# Patient Record
Sex: MALE | Race: BLACK OR AFRICAN AMERICAN | NOT HISPANIC OR LATINO | Employment: UNEMPLOYED | URBAN - METROPOLITAN AREA
[De-identification: names, ages, dates, MRNs, and addresses within clinical notes are randomized per-mention and may not be internally consistent; named-entity substitution may affect disease eponyms.]

---

## 2022-08-20 ENCOUNTER — HOSPITAL ENCOUNTER (EMERGENCY)
Facility: OTHER | Age: 32
Discharge: LEFT BEFORE DISCHARGE | End: 2022-08-20
Attending: EMERGENCY MEDICINE
Payer: MEDICAID

## 2022-08-20 VITALS
SYSTOLIC BLOOD PRESSURE: 131 MMHG | WEIGHT: 195 LBS | DIASTOLIC BLOOD PRESSURE: 82 MMHG | RESPIRATION RATE: 18 BRPM | OXYGEN SATURATION: 99 % | HEART RATE: 81 BPM | BODY MASS INDEX: 26.41 KG/M2 | HEIGHT: 72 IN | TEMPERATURE: 99 F

## 2022-08-20 DIAGNOSIS — R91.8 ABNORMAL CT SCAN OF LUNG: ICD-10-CM

## 2022-08-20 DIAGNOSIS — R05.9 COUGH: ICD-10-CM

## 2022-08-20 DIAGNOSIS — R50.9 FEVER, UNSPECIFIED FEVER CAUSE: ICD-10-CM

## 2022-08-20 DIAGNOSIS — B20 SYMPTOMATIC HIV INFECTION: ICD-10-CM

## 2022-08-20 DIAGNOSIS — J98.4 CAVITARY LESION OF LUNG: Primary | ICD-10-CM

## 2022-08-20 LAB
ALBUMIN SERPL BCP-MCNC: 3.5 G/DL (ref 3.5–5.2)
ALP SERPL-CCNC: 62 U/L (ref 55–135)
ALT SERPL W/O P-5'-P-CCNC: 23 U/L (ref 10–44)
ANION GAP SERPL CALC-SCNC: 10 MMOL/L (ref 8–16)
AST SERPL-CCNC: 25 U/L (ref 10–40)
BASOPHILS # BLD AUTO: 0.02 K/UL (ref 0–0.2)
BASOPHILS NFR BLD: 0.2 % (ref 0–1.9)
BILIRUB SERPL-MCNC: 0.5 MG/DL (ref 0.1–1)
BUN SERPL-MCNC: 7 MG/DL (ref 6–20)
CALCIUM SERPL-MCNC: 9.1 MG/DL (ref 8.7–10.5)
CHLORIDE SERPL-SCNC: 105 MMOL/L (ref 95–110)
CO2 SERPL-SCNC: 24 MMOL/L (ref 23–29)
CREAT SERPL-MCNC: 1 MG/DL (ref 0.5–1.4)
CTP QC/QA: YES
CTP QC/QA: YES
DIFFERENTIAL METHOD: ABNORMAL
EOSINOPHIL # BLD AUTO: 0.3 K/UL (ref 0–0.5)
EOSINOPHIL NFR BLD: 3.1 % (ref 0–8)
ERYTHROCYTE [DISTWIDTH] IN BLOOD BY AUTOMATED COUNT: 12.2 % (ref 11.5–14.5)
EST. GFR  (NO RACE VARIABLE): >60 ML/MIN/1.73 M^2
GLUCOSE SERPL-MCNC: 99 MG/DL (ref 70–110)
GROUP A STREP, MOLECULAR: NEGATIVE
HCT VFR BLD AUTO: 42.7 % (ref 40–54)
HGB BLD-MCNC: 14.9 G/DL (ref 14–18)
HIV 1+2 AB+HIV1 P24 AG SERPL QL IA: POSITIVE
IMM GRANULOCYTES # BLD AUTO: 0.02 K/UL (ref 0–0.04)
IMM GRANULOCYTES NFR BLD AUTO: 0.2 % (ref 0–0.5)
LACTATE SERPL-SCNC: 0.7 MMOL/L (ref 0.5–2.2)
LDH SERPL L TO P-CCNC: 225 U/L (ref 110–260)
LYMPHOCYTES # BLD AUTO: 2.3 K/UL (ref 1–4.8)
LYMPHOCYTES NFR BLD: 28.5 % (ref 18–48)
MCH RBC QN AUTO: 33.2 PG (ref 27–31)
MCHC RBC AUTO-ENTMCNC: 34.9 G/DL (ref 32–36)
MCV RBC AUTO: 95 FL (ref 82–98)
MONOCYTES # BLD AUTO: 0.9 K/UL (ref 0.3–1)
MONOCYTES NFR BLD: 11.2 % (ref 4–15)
NEUTROPHILS # BLD AUTO: 4.6 K/UL (ref 1.8–7.7)
NEUTROPHILS NFR BLD: 56.8 % (ref 38–73)
NRBC BLD-RTO: 0 /100 WBC
PLATELET # BLD AUTO: 246 K/UL (ref 150–450)
PMV BLD AUTO: 10 FL (ref 9.2–12.9)
POC MOLECULAR INFLUENZA A AGN: NEGATIVE
POC MOLECULAR INFLUENZA B AGN: NEGATIVE
POTASSIUM SERPL-SCNC: 4.4 MMOL/L (ref 3.5–5.1)
PROCALCITONIN SERPL IA-MCNC: 0.15 NG/ML
PROT SERPL-MCNC: 8.1 G/DL (ref 6–8.4)
RBC # BLD AUTO: 4.49 M/UL (ref 4.6–6.2)
SARS-COV-2 RDRP RESP QL NAA+PROBE: NEGATIVE
SODIUM SERPL-SCNC: 139 MMOL/L (ref 136–145)
WBC # BLD AUTO: 8.13 K/UL (ref 3.9–12.7)

## 2022-08-20 PROCEDURE — 87116 MYCOBACTERIA CULTURE: CPT | Performed by: NURSE PRACTITIONER

## 2022-08-20 PROCEDURE — 83615 LACTATE (LD) (LDH) ENZYME: CPT | Performed by: NURSE PRACTITIONER

## 2022-08-20 PROCEDURE — 87389 HIV-1 AG W/HIV-1&-2 AB AG IA: CPT | Mod: 91 | Performed by: NURSE PRACTITIONER

## 2022-08-20 PROCEDURE — 87040 BLOOD CULTURE FOR BACTERIA: CPT | Mod: 59 | Performed by: NURSE PRACTITIONER

## 2022-08-20 PROCEDURE — 87389 HIV-1 AG W/HIV-1&-2 AB AG IA: CPT | Performed by: NURSE PRACTITIONER

## 2022-08-20 PROCEDURE — 84145 PROCALCITONIN (PCT): CPT | Performed by: NURSE PRACTITIONER

## 2022-08-20 PROCEDURE — 87015 SPECIMEN INFECT AGNT CONCNTJ: CPT | Performed by: NURSE PRACTITIONER

## 2022-08-20 PROCEDURE — 36415 COLL VENOUS BLD VENIPUNCTURE: CPT | Performed by: NURSE PRACTITIONER

## 2022-08-20 PROCEDURE — 25000003 PHARM REV CODE 250: Performed by: NURSE PRACTITIONER

## 2022-08-20 PROCEDURE — 83605 ASSAY OF LACTIC ACID: CPT | Performed by: NURSE PRACTITIONER

## 2022-08-20 PROCEDURE — 99285 EMERGENCY DEPT VISIT HI MDM: CPT | Mod: 25

## 2022-08-20 PROCEDURE — 80053 COMPREHEN METABOLIC PANEL: CPT | Performed by: NURSE PRACTITIONER

## 2022-08-20 PROCEDURE — 87206 SMEAR FLUORESCENT/ACID STAI: CPT | Performed by: NURSE PRACTITIONER

## 2022-08-20 PROCEDURE — U0002 COVID-19 LAB TEST NON-CDC: HCPCS | Performed by: EMERGENCY MEDICINE

## 2022-08-20 PROCEDURE — 25500020 PHARM REV CODE 255: Performed by: EMERGENCY MEDICINE

## 2022-08-20 PROCEDURE — 85025 COMPLETE CBC W/AUTO DIFF WBC: CPT | Performed by: NURSE PRACTITIONER

## 2022-08-20 PROCEDURE — 87502 INFLUENZA DNA AMP PROBE: CPT

## 2022-08-20 PROCEDURE — 87651 STREP A DNA AMP PROBE: CPT | Performed by: NURSE PRACTITIONER

## 2022-08-20 RX ORDER — CLINDAMYCIN HYDROCHLORIDE 300 MG/1
300 CAPSULE ORAL EVERY 6 HOURS
Qty: 28 CAPSULE | Refills: 0 | Status: SHIPPED | OUTPATIENT
Start: 2022-08-20 | End: 2022-08-27

## 2022-08-20 RX ORDER — ACETAMINOPHEN 500 MG
1000 TABLET ORAL
Status: COMPLETED | OUTPATIENT
Start: 2022-08-20 | End: 2022-08-20

## 2022-08-20 RX ORDER — CLINDAMYCIN HYDROCHLORIDE 150 MG/1
300 CAPSULE ORAL
Status: COMPLETED | OUTPATIENT
Start: 2022-08-20 | End: 2022-08-20

## 2022-08-20 RX ORDER — BENZONATATE 100 MG/1
200 CAPSULE ORAL ONCE
Status: COMPLETED | OUTPATIENT
Start: 2022-08-20 | End: 2022-08-20

## 2022-08-20 RX ORDER — PROMETHAZINE HYDROCHLORIDE AND DEXTROMETHORPHAN HYDROBROMIDE 6.25; 15 MG/5ML; MG/5ML
5 SYRUP ORAL EVERY 6 HOURS PRN
Qty: 120 ML | Refills: 0 | Status: SHIPPED | OUTPATIENT
Start: 2022-08-20 | End: 2022-08-30

## 2022-08-20 RX ADMIN — IOHEXOL 100 ML: 350 INJECTION, SOLUTION INTRAVENOUS at 07:08

## 2022-08-20 RX ADMIN — CLINDAMYCIN HYDROCHLORIDE 300 MG: 150 CAPSULE ORAL at 09:08

## 2022-08-20 RX ADMIN — ACETAMINOPHEN 1000 MG: 500 TABLET, FILM COATED ORAL at 02:08

## 2022-08-20 RX ADMIN — BENZONATATE 200 MG: 100 CAPSULE ORAL at 03:08

## 2022-08-20 NOTE — ED PROVIDER NOTES
Encounter Date: 8/20/2022       History     Chief Complaint   Patient presents with    Cough    Fever    Generalized Body Aches     C/o nonproductive cough, subjective fever, body aches x 3 days with no improvement.  Current temp 102.2 all other VSS.      Chief complaint: Cough    32-year-old male presents for evaluation of nonproductive cough, subjective fever, and body aches.  Patient reports that the cough has been present for at least 2 weeks.  Other symptoms have been present for the last 3 days.  Also reports chills, fatigue, congestion, runny nose, sore throat, and headaches.    This is the extent of patient's complaints for this ER encounter.     The history is provided by the patient.     Review of patient's allergies indicates:  No Known Allergies  History reviewed. No pertinent past medical history.  History reviewed. No pertinent surgical history.  History reviewed. No pertinent family history.  Social History     Tobacco Use    Smoking status: Never Smoker    Smokeless tobacco: Never Used   Substance Use Topics    Alcohol use: Yes     Comment: occasionally    Drug use: No     Review of Systems   Constitutional: Positive for chills, fatigue and fever.   HENT: Positive for congestion, rhinorrhea and sore throat.    Respiratory: Positive for cough. Negative for shortness of breath.    Cardiovascular: Negative for chest pain.   Gastrointestinal: Negative for abdominal pain.   Genitourinary: Negative for difficulty urinating.   Musculoskeletal: Positive for myalgias. Negative for arthralgias, back pain and neck pain.   Skin: Negative for rash and wound.   Neurological: Positive for headaches. Negative for weakness.   Psychiatric/Behavioral: Negative.        Physical Exam     Initial Vitals [08/20/22 1422]   BP Pulse Resp Temp SpO2   134/77 99 19 (!) 102.2 °F (39 °C) 96 %      MAP       --         Physical Exam    Nursing note and vitals reviewed.  Constitutional: No distress.   HENT:   Head:  Normocephalic and atraumatic.   Nose: Nose normal.   Mouth/Throat: Oropharynx is clear and moist and mucous membranes are normal.   Eyes: Conjunctivae, EOM and lids are normal. Right pupil is round. Left pupil is round. Pupils are equal.   Neck: Neck supple.   Cardiovascular: Normal rate, regular rhythm and normal heart sounds.   Pulmonary/Chest: Effort normal and breath sounds normal. No respiratory distress.   Patient is actively coughing.   Musculoskeletal:         General: Normal range of motion.      Cervical back: Neck supple.     Neurological: He is alert and oriented to person, place, and time. He has normal strength.   Skin: Skin is warm and dry. No rash noted.   Psychiatric: He has a normal mood and affect. His behavior is normal.         ED Course   Procedures  Labs Reviewed   CBC W/ AUTO DIFFERENTIAL - Abnormal; Notable for the following components:       Result Value    RBC 4.49 (*)     MCH 33.2 (*)     All other components within normal limits   HIV 1 / 2 ANTIBODY - Abnormal; Notable for the following components:    HIV 1/2 Ag/Ab Positive (*)     All other components within normal limits    Narrative:     Release to patient->Immediate   GROUP A STREP, MOLECULAR   CULTURE, BLOOD   CULTURE, BLOOD   AFB CULTURE & SMEAR   COMPREHENSIVE METABOLIC PANEL   LACTIC ACID, PLASMA   PROCALCITONIN   LACTATE DEHYDROGENASE    Narrative:     Release to patient->Immediate   HIV 1/2 SUPPLEMENTAL ASSAY   SARS-COV-2 RDRP GENE   POCT INFLUENZA A/B MOLECULAR          Imaging Results          CT Chest With Contrast (Final result)  Result time 08/20/22 20:11:20    Final result by Korin Cook MD (08/20/22 20:11:20)                 Impression:      Single thick-walled right upper lobe cavitary lesion with an air-fluid level.  Differential considerations of a cavitating pulmonary lesion may include cavitating the malignancy, such as bronchogenic carcinoma or cavitating pulmonary metastases versus infection, such as  pulmonary tuberculosis or other cavitating pneumonia.  Other etiologies cannot be entirely excluded.    Small patchy opacities in the right upper lobe.  A pneumonic process should be considered.      Electronically signed by: Korin Cook  Date:    08/20/2022  Time:    20:11             Narrative:    EXAMINATION:  CT CHEST WITH CONTRAST    CLINICAL HISTORY:  Cough, fever, generalized body aches.    TECHNIQUE:  Contiguous axial 5 mm images was obtained from the lung apices through the lung bases. Coronal and sagittal reformatted images were provided.  One hundred ml of Omnipaque 350 intravenous contrast was given.    COMPARISON:  None.    FINDINGS:  There is thick-walled right upper lobe cavitary lesion with an air-fluid level.  Small patchy opacities are seen in the right upper lobe.  Mild bibasilar atelectatic changes are present.    Mild mediastinal adenopathy is seen.  There is no 11 x 19 mm 9 precarinal lymph node.  There is no evidence of mediastinal, hilar, or axillary adenopathy.    There is no pleural or pericardial effusion.    The heart size is within normal limits.    In the visualized upper abdomen, the visualized adrenal glands are normal.                                X-Ray Chest PA And Lateral (Final result)  Result time 08/20/22 16:19:01    Final result by Charles Cherry MD (08/20/22 16:19:01)                 Impression:      2.2 x 1.9 cm cavitary nodule in the right upper lobe.  Differential consideration is broad including septic emboli in this patient with fever.  Additional evaluation, as clinically warranted.    This report was flagged in Epic as abnormal.      Electronically signed by: Charles Cherry MD  Date:    08/20/2022  Time:    16:19             Narrative:    EXAMINATION:  XR CHEST PA AND LATERAL    CLINICAL HISTORY:  Cough, unspecified    TECHNIQUE:  PA and lateral views of the chest were performed.    COMPARISON:  None    FINDINGS:  The trachea is unremarkable.  The cardiomediastinal  "silhouette is within normal limits.  The hemidiaphragms unremarkable.  There are no pleural effusions.  There is no evidence of a pneumothorax.  There is no evidence of a pneumomediastinum.  There is a 2.2 x 1.9 cm cavitary nodule in the right upper lobe.  The osseous structures are unremarkable.                                 Medications   acetaminophen tablet 1,000 mg (1,000 mg Oral Given 8/20/22 1458)   benzonatate capsule 200 mg (200 mg Oral Given 8/20/22 1551)   iohexoL (OMNIPAQUE 350) injection 100 mL (100 mLs Intravenous Given 8/20/22 1938)   clindamycin capsule 300 mg (300 mg Oral Given 8/20/22 2147)     Medical Decision Making:   Initial Assessment:   32-year-old male presents for evaluation of URI symptoms.  Reports majority of the symptoms of present for 3 days, but states he has been coughing for the past 2 weeks.  Differential Diagnosis:   COVID, influenza, pneumonia, viral illness  Clinical Tests:   Lab Tests: Ordered  Radiological Study: Ordered  ED Management:  COVID swab noted to be negative.  Additional workup including flu swab and chest x-ray ordered.    Refer to patient course below for additional management.              ED Course as of 08/21/22 0001   Sat Aug 20, 2022   1523 SARS-CoV-2 RNA, Amplification, Qual: Negative [EW]   1644 POC Molecular Influenza A Ag: Negative [EW]   1644 POC Molecular Influenza B Ag: Negative [EW]   1648 "2.2 x 1.9 cm cavitary nodule in the right upper lobe.  Differential consideration is broad including septic emboli in this patient with fever.  Additional evaluation, as clinically warranted."    Discussed with collaborating provider.  Additional workup ordered.    Patient reports that the cough has been present for least 2 weeks.  Subjective fevers at home.  Night sweats present.  Patient reports that he spent 20ish days in [facility] back in May.  Concerning for tuberculosis.  [EW]   2108 HIV 1/2 Ag/Ab(!): Positive:  When discussed with patient, he tells me that he " "was initially diagnosed with HIV a few years ago.  He does not see infectious disease consistently.  He does not take antiviral medication consistently.  Reports that he frequently skips 3-4 doses per week.  [EW]   2109 Lactate, Jb: 0.7 [EW]   2109 Procalcitonin: 0.15 [EW]   2110 "Single thick-walled right upper lobe cavitary lesion with an air-fluid level.  Differential considerations of a cavitating pulmonary lesion may include cavitating the malignancy, such as bronchogenic carcinoma or cavitating pulmonary metastases versus infection, such as pulmonary tuberculosis or other cavitating pneumonia.  Other etiologies cannot be entirely excluded.     Small patchy opacities in the right upper lobe.  A pneumonic process should be considered." [EW]   2203 I feel that patient would benefit from admission to hospital versus transfer to another facility for infectious disease.  Either way, patient does not want to be admitted.  He is requesting to be discharged home.  He will sign out against medical advice.  Referral placed to pulmonology and Infectious Disease.  Patient prescribed clindamycin antibiotics.  Educated on the importance of taking clindamycin antibiotics and HIV medications as previously prescribed.  Informed that ultimately, inpatient hospitalization may be necessary.  Tuberculosis testing is pending at this time.  Will attempt to contact resources so patient can have outpatient follow-up. Educated on need for quarantine.  [EW]      ED Course User Index  [EW] Ela Jones NP             Clinical Impression:   Final diagnoses:  [R05.9] Cough  [R91.8] Abnormal CT scan of lung  [B20] Symptomatic HIV infection  [J98.4] Cavitary lesion of lung (Primary)  [R50.9] Fever, unspecified fever cause          ED Disposition Condition    AMA               Ela Jones NP  08/21/22 9968    "

## 2022-08-20 NOTE — ED TRIAGE NOTES
Pt presents to the ER with complaints of nonproductive cough, subjective fever, body aches x 3 days with no improvement.

## 2022-08-20 NOTE — ED NOTES
LOC: The patient is awake, alert, and oriented to self, place, time, and situation. Pt is calm and cooperative. Affect is appropriate.  Speech is appropriate and clear.     APPEARANCE: Patient resting on stretcher in no acute distress.  Patient is clean and well groomed.    SKIN: The skin is dry but hot to touch; pt is febrile. Color consistent with ethnicity.  Patient has normal skin turgor and moist mucus membranes.  Skin intact; no breakdown or bruising noted.     MUSCULOSKELETAL: Patient moving upper and lower extremities without difficulty; denies pain in the extremities or back but complains of generalized body achese.  Denies weakness.     RESPIRATORY: Airway is open and patent. Respirations spontaneous, even, easy, and non-labored.  Patient has a normal effort and rate.  No accessory muscle use noted. Reports a nonproductive cough.     CARDIAC:  Normal rate noted.  No peripheral edema noted. No complaints of chest pain.      ABDOMEN: Soft and non tender to palpation.  No distention noted. Pt denies abdominal pain; denies nausea, vomiting, diarrhea, or constipation.    NEUROLOGIC: Eyes open spontaneously.  Behavior appropriate to situation.  Follows commands; facial expression symmetrical.  Purposeful motor response noted; normal sensation in all extremities. Pt denies headache; denies lightheadedness or dizziness; denies visual disturbances; denies loss of balance; denies unilateral weakness.

## 2022-08-20 NOTE — ED NOTES
Notified charge nurse pt is being ruled out forTB per Ela Jones NP. Pt will moved to room:6 (negative pressure room) per charge nurse. Droplet precautions initiated as per hospital policy.

## 2022-08-21 NOTE — DISCHARGE INSTRUCTIONS
**Follow up with infectious disease and pulmonology in 24-48 hours. Return to ER with worsening of symptoms. Home isolation while testing is pending.     Take antibiotics until course completion.     **Over the counter medications as needed as directed on package insert. Drink plenty fluids. Get plenty rest. Wash hands frequently.

## 2022-08-21 NOTE — ED NOTES
Pt stated that he did not want to stay, provider advised pt that he needed to be admitted to the hosp. Pt still refused, pt was told of possible consequences of leaving AMA, pt signed AMA and witnessed by rn and signed by provider

## 2022-08-22 LAB
HIV SUPPLEMENTAL ASSAY INTERPRETATION: ABNORMAL
HIV-1 RESULT: POSITIVE
HIV-2 RESULT: NEGATIVE

## 2022-08-26 LAB
BACTERIA BLD CULT: NORMAL
BACTERIA BLD CULT: NORMAL

## 2022-10-09 LAB
ACID FAST MOD KINY STN SPEC: NORMAL
MYCOBACTERIUM SPEC QL CULT: NORMAL